# Patient Record
Sex: FEMALE | NOT HISPANIC OR LATINO | Employment: FULL TIME | URBAN - METROPOLITAN AREA
[De-identification: names, ages, dates, MRNs, and addresses within clinical notes are randomized per-mention and may not be internally consistent; named-entity substitution may affect disease eponyms.]

---

## 2022-01-01 ENCOUNTER — PATIENT OUTREACH (OUTPATIENT)
Dept: ONCOLOGY | Facility: CLINIC | Age: 42
End: 2022-01-01

## 2022-01-01 ENCOUNTER — TRANSCRIBE ORDERS (OUTPATIENT)
Dept: OTHER | Age: 42
End: 2022-01-01

## 2022-01-01 ENCOUNTER — DOCUMENTATION ONLY (OUTPATIENT)
Dept: ONCOLOGY | Facility: CLINIC | Age: 42
End: 2022-01-01

## 2022-01-01 ENCOUNTER — OFFICE VISIT (OUTPATIENT)
Dept: ONCOLOGY | Facility: CLINIC | Age: 42
End: 2022-01-01
Attending: INTERNAL MEDICINE
Payer: COMMERCIAL

## 2022-01-01 VITALS
OXYGEN SATURATION: 98 % | HEART RATE: 112 BPM | TEMPERATURE: 98.6 F | DIASTOLIC BLOOD PRESSURE: 89 MMHG | WEIGHT: 183 LBS | SYSTOLIC BLOOD PRESSURE: 138 MMHG

## 2022-01-01 DIAGNOSIS — C34.90 SMALL CELL LUNG CANCER (H): Primary | ICD-10-CM

## 2022-01-01 DIAGNOSIS — C34.90 SMALL CELL LUNG CANCER (H): ICD-10-CM

## 2022-01-01 PROCEDURE — G0463 HOSPITAL OUTPT CLINIC VISIT: HCPCS

## 2022-01-01 PROCEDURE — 99215 OFFICE O/P EST HI 40 MIN: CPT | Performed by: INTERNAL MEDICINE

## 2022-01-01 PROCEDURE — 99417 PROLNG OP E/M EACH 15 MIN: CPT | Performed by: INTERNAL MEDICINE

## 2022-01-01 RX ORDER — LUBIPROSTONE 24 UG/1
CAPSULE ORAL
COMMUNITY
Start: 2022-01-01

## 2022-01-01 RX ORDER — GABAPENTIN 300 MG/1
CAPSULE ORAL
COMMUNITY
Start: 2022-01-01

## 2022-01-01 RX ORDER — PROCHLORPERAZINE MALEATE 10 MG
TABLET ORAL
COMMUNITY
Start: 2022-01-01

## 2022-01-01 RX ORDER — OLANZAPINE 10 MG/1
10 TABLET ORAL
COMMUNITY
Start: 2022-01-01

## 2022-01-01 RX ORDER — IBUPROFEN 200 MG
1 CAPSULE ORAL 2 TIMES DAILY
COMMUNITY
Start: 2022-01-01

## 2022-01-01 RX ORDER — CEFUROXIME AXETIL 250 MG/1
6 TABLET ORAL
COMMUNITY
Start: 2021-03-01

## 2022-01-01 RX ORDER — LORAZEPAM 0.5 MG/1
TABLET ORAL
COMMUNITY
Start: 2022-01-30

## 2022-01-01 RX ORDER — DOCUSATE SODIUM 100 MG/1
100 CAPSULE, LIQUID FILLED ORAL
COMMUNITY

## 2022-01-01 RX ORDER — QUETIAPINE FUMARATE 50 MG/1
TABLET, FILM COATED ORAL
COMMUNITY
Start: 2022-01-01

## 2022-01-01 RX ORDER — ALBUTEROL SULFATE 0.83 MG/ML
2.5 SOLUTION RESPIRATORY (INHALATION)
COMMUNITY
Start: 2021-09-22

## 2022-01-01 RX ORDER — CLINDAMYCIN PHOSPHATE 10 MG/G
GEL TOPICAL
COMMUNITY
Start: 2022-01-01

## 2022-01-01 RX ORDER — ALBUTEROL SULFATE 90 UG/1
AEROSOL, METERED RESPIRATORY (INHALATION)
COMMUNITY
Start: 2022-01-01

## 2022-01-01 RX ORDER — CLOBETASOL PROPIONATE 0.5 MG/G
OINTMENT TOPICAL
COMMUNITY
Start: 2022-01-01

## 2022-01-01 RX ORDER — DULOXETIN HYDROCHLORIDE 60 MG/1
60 CAPSULE, DELAYED RELEASE ORAL
COMMUNITY

## 2022-01-01 RX ORDER — EPINEPHRINE 0.3 MG/.3ML
0.3 INJECTION SUBCUTANEOUS
COMMUNITY
Start: 2021-10-01

## 2022-01-01 RX ORDER — DEXAMETHASONE 4 MG/1
TABLET ORAL
COMMUNITY
Start: 2022-01-01

## 2022-01-01 RX ORDER — CETIRIZINE HYDROCHLORIDE 10 MG/1
TABLET ORAL
COMMUNITY
Start: 2022-01-01

## 2022-01-01 RX ORDER — ELETRIPTAN HYDROBROMIDE 20 MG/1
20 TABLET, FILM COATED ORAL
COMMUNITY

## 2022-01-01 RX ORDER — VALACYCLOVIR HYDROCHLORIDE 1 G/1
TABLET, FILM COATED ORAL
COMMUNITY
Start: 2021-06-01

## 2022-01-01 RX ORDER — BENZONATATE 100 MG/1
100 CAPSULE ORAL
COMMUNITY
Start: 2021-04-26

## 2022-01-01 RX ORDER — SENNOSIDES A AND B 8.6 MG/1
8.6 TABLET, FILM COATED ORAL
COMMUNITY

## 2022-01-01 RX ORDER — SCOLOPAMINE TRANSDERMAL SYSTEM 1 MG/1
1.5 PATCH, EXTENDED RELEASE TRANSDERMAL
COMMUNITY
Start: 2022-01-01

## 2022-01-01 RX ORDER — BISACODYL 5 MG
TABLET, DELAYED RELEASE (ENTERIC COATED) ORAL
COMMUNITY
Start: 2022-02-08

## 2022-01-01 RX ORDER — OXYCODONE HYDROCHLORIDE 20 MG/1
TABLET ORAL
COMMUNITY
Start: 2022-01-01

## 2022-01-01 RX ORDER — CEFADROXIL 500 MG/1
CAPSULE ORAL
COMMUNITY
Start: 2022-01-01

## 2022-01-01 RX ORDER — BISACODYL 10 MG
SUPPOSITORY, RECTAL RECTAL
COMMUNITY
Start: 2022-02-08

## 2022-01-01 RX ORDER — HYDROXYZINE HYDROCHLORIDE 25 MG/1
25 TABLET, FILM COATED ORAL
COMMUNITY

## 2022-01-01 RX ORDER — ALBUTEROL SULFATE 90 UG/1
AEROSOL, METERED RESPIRATORY (INHALATION)
COMMUNITY
Start: 2020-10-29

## 2022-01-01 RX ORDER — TRIAMCINOLONE ACETONIDE 1 MG/G
CREAM TOPICAL
COMMUNITY
Start: 2022-01-01

## 2022-01-01 RX ORDER — TERBINAFINE HYDROCHLORIDE 250 MG/1
1 TABLET ORAL DAILY
COMMUNITY
Start: 2020-12-14

## 2022-01-01 RX ORDER — SCOLOPAMINE TRANSDERMAL SYSTEM 1 MG/1
PATCH, EXTENDED RELEASE TRANSDERMAL
COMMUNITY
Start: 2022-01-01

## 2022-01-01 RX ORDER — BUPROPION HYDROCHLORIDE 300 MG/1
TABLET ORAL
COMMUNITY
Start: 2022-01-01

## 2022-01-01 RX ORDER — POTASSIUM CHLORIDE 1500 MG/1
TABLET, EXTENDED RELEASE ORAL
COMMUNITY
Start: 2022-01-01

## 2022-01-01 RX ORDER — LACTULOSE 10 G/15ML
20 SOLUTION ORAL
COMMUNITY
Start: 2022-02-08

## 2022-01-01 RX ORDER — FLUTICASONE PROPIONATE 50 MCG
SPRAY, SUSPENSION (ML) NASAL
COMMUNITY
Start: 2022-01-01

## 2022-01-01 RX ORDER — FLUTICASONE PROPIONATE AND SALMETEROL 500; 50 UG/1; UG/1
POWDER RESPIRATORY (INHALATION)
COMMUNITY
Start: 2022-01-30

## 2022-01-01 RX ORDER — OXYCODONE HYDROCHLORIDE 20 MG/1
20 TABLET ORAL
COMMUNITY
Start: 2022-01-01 | End: 2022-01-01

## 2022-01-01 RX ORDER — CALCIUM CARBONATE 500(1250)
TABLET ORAL
COMMUNITY
Start: 2022-01-01

## 2022-01-01 RX ORDER — POLYETHYLENE GLYCOL 3350 17 G/17G
POWDER, FOR SOLUTION ORAL
COMMUNITY
Start: 2022-01-01

## 2022-01-01 RX ORDER — DOXYCYCLINE 100 MG/1
CAPSULE ORAL
COMMUNITY
Start: 2021-10-08

## 2022-01-01 RX ORDER — HYDROXYZINE PAMOATE 25 MG/1
CAPSULE ORAL
COMMUNITY
Start: 2021-01-19

## 2022-01-01 RX ORDER — CYCLOBENZAPRINE HCL 10 MG
5 TABLET ORAL
COMMUNITY
Start: 2022-02-08

## 2022-01-01 ASSESSMENT — PAIN SCALES - GENERAL: PAINLEVEL: SEVERE PAIN (7)

## 2022-07-07 NOTE — PROGRESS NOTES
Review of Oncology referral fr Onc at [facility] for pt with SCLC.    Dr Clark ([facility]) referring to N for phase I clinical trials. Hx of SCLC 3/2021, mets bone, brain, on treatment at [facility].     Will offer next available DTC consult next week or the following week per pt preference.       Addendum 7/18: Called pt 3x this week to schedule with DTC but no response from pt. VMs left on listed phones ([phone] & [phone]). Letter sent to home address on file to call and schedule at her convenience. Msg left with Dr Clark's office (referring doc) with this update.

## 2022-07-07 NOTE — PROGRESS NOTES
Action July 7, 2022 10:24 AM ABT   Action Taken Records in -[facility] updated. Some images from [facility] received and resolved to PACS. Image request sent to [facility] for additional chest images.

## 2022-07-27 NOTE — PROGRESS NOTES
Luna called in requesting a r/s of the consult appointment she no showed for.  I have advised new patient scheduling she be rescheduled for the next available spot.

## 2022-08-10 PROBLEM — M84.353D: Status: ACTIVE | Noted: 2020-08-12

## 2022-08-10 PROBLEM — C34.90 PRIMARY LUNG CANCER WITH METASTASIS FROM LUNG TO OTHER SITE (H): Status: ACTIVE | Noted: 2021-03-04

## 2022-08-10 PROBLEM — E55.9 VITAMIN D INSUFFICIENCY: Status: ACTIVE | Noted: 2020-07-30

## 2022-08-10 PROBLEM — N81.4 UTERUS PROLAPSE: Status: ACTIVE | Noted: 2022-01-01

## 2022-08-10 NOTE — PROGRESS NOTES
[facility]    Date of Visit: 8/10/2022       Subject Name: Luna Bacon  MRN: [id number]  YOB: 1980     Reason for Visit: Consult Visit    History of Present Illness:  Luna Bacon is a 41 year old woman with a history of extensive stage small cell lung cancer diagnosed by EBUS on 3/12/21.     On 3/19/2021, she was treated with carboplatin, paclitaxel, atezolizumab x6 cycles with a good response, followed by atezolizumab maintenance with progression of disease in October 2021. She received WBRT in 10/2021 and completed palliative RT to the chest and T-spine and S1-S2 in November 2021. She then received lurbinectedin from 11/22/21 to  6/13/22, stopped due to disease progression.    On 6/13/2022 CT-neck there was disease progression with enlarging mass at the 5.2 cm in the left basilar neck encasing the left common carotid artery with moderate mass-effect on the left internal jugular vein. She completed palliative RT to the neck mass on 6/16/22-7/7/2022. During the course of radiation therapy, she developed acute pancreatitis. She then had MRI of the brain on 7/13/2022, which showed 2 small lesions concerning for new metastatic disease. The plan is for her to see radiation oncology for radiation to two new brain lesions.     On 7/27/22 she received the first dose of palliative paclitaxel 175 mg/m  every 3 weeks.     She reports that her breathing is ok. She is able to walk around today without shortness of breath or oxygen. There is increase in cough, lately, but generally a non-productive and dry cough. No shortness of breath. She is able to climb stairs with some shortness of breath and dizziness. Nausea appears to be worse recently. She has not eaten today because she is nauseous and nothing tastes right to her. She had the dry heaves on the way in today. She is taking compazine and using scopolamine patches. She still can't keep food down and throws up. No  diarrhea, or constipation. Occasional chills, but no fevers.    She is dropping weight rapidly, she lost 10 lbs per week early on in her disease. The weight stabilized, but over the last 3 weeks she has lost an additional 10 lbs. She has noted numbness and tingling and pain in the heels of bilateral feet. She denies any major numbness or tingling in the fingertips. These symptoms are new since starting paclitaxel.      Oncology History:  - Presented in 2/2021 with 2-month history of daily headache. Also with persistent/worsening cough.  - MRI brain 3/2/21 showed 3 enhancing lesions in the left frontal lobe and bilateral cerebellum, largest 1cm in size, suspicious for metastatic disease.   - CT CAP 3/8/2021 showed an infiltrative mediastinal/left infrahilar soft tissue mass, up to 5.4cm in the hilum, causing mass effect and narrowing on the LLL pulmonary artery and LLL bronchi. Along with a 5mm RLL nodule.  - MRI C/T/L spine showed metastases in T6, T10 and right sacrum. No paraspinal/epidural spread.  - EBUS-FNA of L paratracheal LN showed metastatic small cell carcinoma of the lung.  - 3/19/21 - started carboplatin, etoposide, atezolizumab.  - Completed SRS to the 3 brain lesions 3/29-4/1/21.  - PET/CT 4/28/21 after C2 showed significant decrease in tumor burden. Significantly decreased size of the left infrahilar soft tissue mass and left lower paratracheal dusty conglomerate. There was subtle focal FDG uptake within the right sacral metastatic lesion, and no significant uptake within the T6 and T10 metastatic lesions. There was focal uptake in the right paraspinal soft tissues at the level of S1 without an underlying mass lesion on CT, which was indeterminate and probably related to benign musculoskeletal uptake with attention on follow-up recommended.  - On 5/14/2021, she was admitted with new onset shortness of breath and chest pain and found to have new PE involving the MILA, RUL and RML. CT also showed LLL  opacities with concern for possible lymphangitic spread and possible LLL PA occlusion by tumor, for which palliative RT was considered. However on further review at tumor board, these new changes since recent PET/CT 2 weeks prior were not convincing for progression and were thought to be more likely related to PE/infarcts, with recommendation to obtain f/u PET/CT for further evaluation. Patient was symptomatically much improved after starting anticoagulation.  Follow-up PET/CT 5/24/21 showed no evidence of progression. The LLL findings were consistent with pulmonary infarcts and no longer concerning for progression.  - completed 6 cycles of carboplatin, etoposide, atezolizumab on 7/9/21, with good response.   - CT CAP 7/26/2021 showed good response, with decreased size of the L hilar mass, and paratracheal and adjacent LN's; improved diameter of the LLL bronchi and improved LLL opacities; unchanged LLL pulm occlusion by tumor with some recanalization. Stable sclerotic foci in T6, T10 and right sacrum, consistent with healing bone metastases.  - atezolizumab maintenance 7/29/21-9/8/21.  - 10/2021 - POD in lungs and multiple new brain metastases. Increased size of the R hilar/mediastinal mass/dusty conglomerate encasing the distal L main pulmonary artery and LLL segmental pulmonary arteries and abutting the esophagus. Had significantly increased cough, and intermittent left-sided facial numbness and ipsilateral upper extremity and lower extremity numbness relating to the brain metastases.  - Completed WBRT 10/14-11/2/21, and palliative RT to chest/T spine and the painful S1-S2 area 11/3-11/16/21.  - 11/22/21- started lurbinectedin. Received through 6/13/22, stopped due to disease progression  - 7/27/22- started paclitaxel    Treatment History:  - 3/19-7/9/21 - carboplatin, etoposide, atezolizumab   - atezolizumab maintenance until 9/8/21.  - Oct/Nov 2021 - WBRT and palliative RTto the chest/T-spine and S1-S2  11/16/21.  - 11/22/21- started lurbinectedin  - 7/27/22- started paclitaxel    Molecular data:   No  mutation. PD-L1 0%. TMB high (10mut/Mb)      Medications:  I have personally reviewed concomitant medications.  See full list of medications below:   Current Outpatient Medications   Medication Sig Dispense Refill     albuterol (PROAIR HFA/PROVENTIL HFA/VENTOLIN HFA) 108 (90 Base) MCG/ACT inhaler INHALE TWO PUFFS BY MOUTH EVERY 4 HOURS AS NEEDED FOR SHORTNESS OF BREATH / wheeze       albuterol (PROVENTIL) (2.5 MG/3ML) 0.083% neb solution 2.5 mg       benzonatate (TESSALON) 100 MG capsule Take 100 mg by mouth       Buprenorphine HCl (BELBUCA) 600 MCG FILM buccal film Place 600 mcg inside cheek       calcium carbonate 500 mg, elemental, (OSCAL 500) 1250 (500 Ca) MG TABS tablet Take 1 tablet by mouth 2 times daily       cholecalciferol 25 MCG (1000 UT) TABS Take 2,000 Units by mouth       clindamycin (CLINDAMAX) 1 % external gel apply topically TO face TWICE DAILY AS NEEDED       cyclobenzaprine (FLEXERIL) 10 MG tablet Take 5 mg by mouth       EPINEPHrine (ANY BX GENERIC EQUIV) 0.3 MG/0.3ML injection 2-pack Inject 0.3 mg into the muscle       hydrOXYzine (VISTARIL) 25 MG capsule Take 1 capsule by mouth twice a day as needed anxiety       lactulose (CHRONULAC) 10 GM/15ML solution Take 20 g by mouth       lubiprostone (AMITIZA) 24 MCG capsule Take 1 capsule (24 mcg) by mouth twice daily with meals.       naloxone (NARCAN) 4 MG/0.1ML nasal spray Spray 1 spray in nostril       OLANZapine (ZYPREXA) 10 MG tablet Take 10 mg by mouth       oxyCODONE HCl (ROXICODONE) 20 MG TABS immediate release tablet Take 20 mg by mouth       scopolamine (TRANSDERM) 1 MG/3DAYS 72 hr patch Place 1.5 mg onto the skin       SUMAtriptan (IMITREX STATDOSE) 6 MG/0.5ML pen injector kit Inject 6 mg Subcutaneous       terbinafine (LAMISIL) 250 MG tablet Take 1 tablet by mouth daily       valACYclovir (VALTREX) 1000 mg tablet TAKE 1 TABLET BY  MOUTH ONCE DAILY IN THE MORNING       Acetaminophen 500 MG PACK Take 500 mg by mouth       bisacodyl (DULCOLAX) 10 MG suppository        bisacodyl (DULCOLAX) 5 MG EC tablet        buPROPion (WELLBUTRIN XL) 300 MG 24 hr tablet        calcium carbonate 500 mg, elemental, (OSCAL) 500 MG tablet        cefadroxil (DURICEF) 500 MG capsule        CENTRUM TABS   OR 1 TABLET DAILY 30 0     cetirizine (ZYRTEC) 10 MG tablet        clindamycin (CLINDAMAX) 1 % external gel        clobetasol (TEMOVATE) 0.05 % external ointment        dexamethasone (DECADRON) 4 MG tablet        docusate sodium (DSS) 100 MG capsule Take 100 mg by mouth       doxycycline hyclate (VIBRAMYCIN) 100 MG capsule        DULoxetine (CYMBALTA) 60 MG capsule Take 60 mg by mouth       eletriptan (RELPAX) 20 MG tablet Take 20 mg by mouth       fluticasone (FLONASE) 50 MCG/ACT nasal spray        fluticasone-salmeterol (ADVAIR DISKUS) 500-50 MCG/ACT inhaler        gabapentin (NEURONTIN) 300 MG capsule TAKE 3 CAPSULES BY MOUTH THREE TIMES A DAY       hydrOXYzine (ATARAX) 25 MG tablet Take 25 mg by mouth       hyoscyamine (LEVSIN/SL) 0.125 MG sublingual tablet        LORazepam (ATIVAN) 0.5 MG tablet        lubiprostone (AMITIZA) 24 MCG capsule        omeprazole (PRILOSEC) 20 MG DR capsule        oxyCODONE HCl (ROXICODONE) 20 MG TABS immediate release tablet        polyethylene glycol (MIRALAX) 17 GM/Dose powder        potassium chloride ER (K-TAB) 20 MEQ CR tablet        PREDNISONE TABS 20 MG OR 2 tabs daily for 5 days 10 0     PROAIR  (90 Base) MCG/ACT inhaler        prochlorperazine (COMPAZINE) 10 MG tablet        QUEtiapine (SEROQUEL) 50 MG tablet TAKE 2-3 TABLETS BY MOUTH AT BEDTIME AS NEEDED FOR ANXIETY AND SLEEP *DOSE INCREASE*       scopolamine (TRANSDERM) 1 MG/3DAYS 72 hr patch        senna (SENOKOT) 8.6 MG tablet Take 8.6 mg by mouth       triamcinolone (KENALOG) 0.1 % external cream        Allergies:  Allergies   Allergen Reactions     Bee Venom  Itching, Rash, Shortness Of Breath and Swelling     Other reaction(s): Abnormal Behavior, Asthma Exacerbation, Blurred Vision, Confusion, Delirium, Disorientation, Flushing, Sweating     Menthol Hives     Other reaction(s): Hives  Other reaction(s): Hives  Mint too  Other reaction(s): Hives  Mint too       No Known Allergies      Ondansetron Nausea and Vomiting     GI upset, lost taste and lost weight significantly     Codeine Nausea and Vomiting     Other reaction(s): Vomiting       Tuberculin Rash       Medical History:   1. Arthritis   2. Asthma   3. Hypertension   4. Methamphetamine abuse   5. Small cell carcinoma   6. Uterus prolapse     Surgical History:   1. BRONCHOSCOPY WITH ENDOBRONCHIAL ULTRASOUND Unknown 3/12/2021   Procedure: BRONCHOSCOPY WITH ENDOBRONCHIAL ULTRASOUND; Laterality: Unknown; Surgeon: Toro Hernandez MD; Service: Pulmonary   2. GI COLON WITH BIOPSY N/A 5/6/2019   Procedure: GI COLON WITH BIOPSY; Laterality: N/A; Surgeon: Lloyd Martinez MD; Service: Gastroenterology   3. GI EGD WITH BIOPSY N/A 5/6/2019   Procedure: GI EGD WITH BIOPSY; Laterality: N/A; Surgeon: Lloyd Martinez MD; Service: Gastroenterology   4. L&D D&C SUCTION   5. ORIF FEMUR, NECK Left 7/28/2020   Procedure: ORIF FEMUR, NECK; Laterality: Left; Surgeon: Madina Mahajan MD; Service: Orthopedics   6. JAYY-CATH PLACEMENT Unknown 4/9/2021   Procedure: PORTACATH PLACEMENT; Laterality: Unknown; Surgeon: Alphonso Meneses MD; Service: General Surgery   7. SKIN GRAFT SPLIT THICKNESS   R thigh and R foot    Social History:   She smoked 0.5-1ppd for 25y, quit in 2018. Previously used IV meth, sober since 2018. No alcohol currently, denies history of heavy use.       Family History:  Breast cancer in her mother in her mid 30s, liver cancer in her mother in early 70s.      Physical Exam:  /89 (BP Location: Right arm, Patient Position: Sitting, Cuff Size: Adult Regular)   Pulse 112   Temp 98.6  F (37  C) (Oral)   Wt 83  kg (183 lb)   SpO2 98%    GEN: NAD  HEENT: EOMI, no icterus, injection or pallor. Oropharynx clear  RESP: No audible wheeze, cough, or visible cyanosis.  No visible retractions or increased work of breathing.    SKIN: Visible skin clear. No significant rash, abnormal pigmentation or lesions.  NEURO: Cranial nerves grossly intact.  Mentation and speech appropriate for age.  PSYCH: Mentation appears normal, affect normal/bright, judgement and insight intact, normal speech and appearance well-groomed.      LABS AND IMAGIN/3/2022   Ref Range & Units 7 d ago   WBC 4 - 10 k/cmm 2.87 Low     RBC 3.9 - 5.2 m/cmm 4.19    Hgb 11.5 - 15.7 g/dL 11.7    Hematocrit 34 - 45 % 37.8    MCV 80 - 100 fL 90.2    MCH 25 - 32 pg 27.9    MCHC 31 - 36 g/dL 31.0    RDW 11.5 - 14.5 % 14.4    Plt 150 - 400 k/cmm 225    MPV 6.5 - 12.5 fL 10.0    Automated Abs Neutrophil 1.7 - 6.5 k/cmm 1.98    Abs Immature Granulocyte 0 - 0.09 k/cmm 0.01    Abs Neutrophil 1.7 - 6.5 k/cmm 1.98    Abs Lymphocyte 0.8 - 4 k/cmm 0.41 Low     Abs Monocyte 0.2 - 1 k/cmm 0.18 Low     Abs Eosinophil 0 - 0.6 k/cmm 0.24    Abs Basophil 0 - 0.2 k/cmm 0.05         Ref Range & Units 7 d ago   Sodium 135 - 148 mEq/L 134 Low     Potassium 3.5 - 5.3 mEq/L 3.3 Low     Chloride 92 - 108 mEq/L 100    CO2 22 - 30 mEq/L 22    AnGap 8 - 16 mEq/L 12    Glucose 70 - 100 mg/dL 176 High     BUN 6 - 20 mg/dL 9    Creatinine 0.5 - 1 mg/dL 0.73    Calcium 8.6 - 10 mg/dL 9.3    eGFR, High >=60 ml/min/1.73m2 117    eGFR, Low >=60 ml/min/1.73m2 101      I have independently reviewed and interpreted the labs.    Brain MRI without and with contrast 2022     Indication: Reassess brain mets  .     Comparison:  2022     Technique: Sagittal T1-weighted, axial T2-weighted, TurboFLAIR, T1-weighted, and diffusion and susceptibility-weighted images were obtained without intravenous contrast.  Post intravenous contrast (using gadolinium) axial and coronal T1-weighted images were  obtained.     Findings: There is a new small (6 mm) contrast-enhancing lesion at the gray-white matter junction within an anterior left parietal lobe gyrus (axial post image #19, coronal post gadolinium image #26), with mild surrounding FLAIR hyperintensity likely representing edema this appears probably residing within the anterior left parietal lobe in a gyrus that is located just posterior to the postcentral gyrus. Another small (4 mm) abnormally contrast-enhancing focus has developed at gray matter white matter junction within the left middle frontal gyrus (axial post image #20, coronal post gadolinium image #10), with extremely subtle associated FLAIR hyperintensity.     No other new intracranial contrast enhancing lesion is suspected. Scattered small foci of hemosiderin deposition the bilateral cerebrum and cerebellum appear grossly stable, known to be located at sites of prior contrast enhancing lesions that have involuted. The images reveal hydrocephalus, midline shift, nor abnormal extraaxial fluid collections. The cerebral ventricles and sulci appear normal for age. The cerebral white matter and gray matter otherwise appear normal for age. Axial diffusion weighted images are considered unremarkable. The bones and extracranial soft tissues are considered unremarkable. Normal vascular flow voids are seen. The visualized portions of the orbits, mastoids, and paranasal sinuses considered unremarkable.     IMPRESSION   Impression:     1. Small contrast enhancing lesions have developed in the left frontal lobe and left parietal lobe since the prior examination, as detailed above; these are suspicious for new metastatic lesions.   2. No other significant interval change suspected.     Imaging was personally reviewed and interpreted by me.      Eastern Cooperative Oncology Group (ECOG) Performance Status: 2    0 Fully active, able to carry on all pre-disease performance without restriction   1 Restricted in  physically strenuous activity but ambulatory and able to carry out work of a light   2 Ambulatory and capable of all self care but unable to carry out any work activities; up and about more than 50% of waking hours   3 Capable of only limited self care; confined to bed or chair more than 50% of waking hours   4 Completely disabled; cannot carry on any self care; totally confined to bed or chair   5 Dead          Assessment and Plan:  1. Extensive stage small cell lung cancer  2. Recurrent brain metastasis  3. History of pulmonary embolism, 5/2021  Luna Bacon is a 41 year old woman with extensive stage small cell lung cancer to soft tissues, bone, and brain.  She recently progressed on lurbinectedin. On June CT-scan she was found to have a left neck mass, for which she completed palliative radiation therapy. Then in July she had brain restaging, which showed evidence of two small brain metastases, which have not yet been treated. She started paclitaxel on 7/27/22.    We reviewed that currently we do not have any clinical trials available for her disease histology. Further, she would require treated and stable brain metastases for future trial participation. She has an appointment already with [place] [facility] [place] to receive additional radiation therapy if feasible.    She was invited to return to the DTC clinic in the future once brain metastases are stably treated and at the time of progression on the current paclitaxel regimen. I suggested she reach back out to review up to date information about available trials then.    She was appreciative of the information. Questions answered.    I spent a total of 90 minutes on the day of the visit.   Time spent doing chart review, history and exam, documentation and further activities per the note      Khoi Camargo MD  [facility]   [facility]  Clinical Trials Office

## 2022-08-10 NOTE — NURSING NOTE
Oncology Rooming Note    August 10, 2022 10:26 AM   Luna Bacon is a 41 year old female who presents for:    Chief Complaint   Patient presents with     Oncology Clinic Visit     Small cell lung cancer     Initial Vitals: /89 (BP Location: Right arm, Patient Position: Sitting, Cuff Size: Adult Regular)   Pulse 112   Temp 98.6  F (37  C) (Oral)   Wt 83 kg (183 lb)   SpO2 98%  There is no height or weight on file to calculate BMI. There is no height or weight on file to calculate BSA.  Severe Pain (7) Comment: Data Unavailable   No LMP recorded.  Allergies reviewed: Yes  Medications reviewed: Yes    Medications: Medication refills not needed today.  Pharmacy name entered into [employer]: [id number] [place], [place] - [address] [place] [address]    Clinical concerns: none.       Marek Brantley